# Patient Record
Sex: FEMALE | Race: WHITE | NOT HISPANIC OR LATINO | Employment: FULL TIME | URBAN - METROPOLITAN AREA
[De-identification: names, ages, dates, MRNs, and addresses within clinical notes are randomized per-mention and may not be internally consistent; named-entity substitution may affect disease eponyms.]

---

## 2017-08-23 ENCOUNTER — HOSPITAL ENCOUNTER (OUTPATIENT)
Dept: MAMMOGRAPHY | Facility: HOSPITAL | Age: 47
Discharge: HOME | End: 2017-08-23
Attending: FAMILY MEDICINE

## 2017-08-23 DIAGNOSIS — Z12.31 VISIT FOR SCREENING MAMMOGRAM: ICD-10-CM

## 2018-08-22 ENCOUNTER — RECORDS - HEALTHEAST (OUTPATIENT)
Dept: LAB | Facility: CLINIC | Age: 48
End: 2018-08-22

## 2018-08-22 LAB
CHOLEST SERPL-MCNC: 152 MG/DL
FASTING STATUS PATIENT QL REPORTED: ABNORMAL
FSH SERPL-ACNC: 37.2 MIU/ML
HDLC SERPL-MCNC: 49 MG/DL
LDLC SERPL CALC-MCNC: 80 MG/DL
TRIGL SERPL-MCNC: 116 MG/DL
TSH SERPL DL<=0.005 MIU/L-ACNC: 1.5 UIU/ML (ref 0.3–5)

## 2018-08-31 ENCOUNTER — HOSPITAL ENCOUNTER (OUTPATIENT)
Dept: MAMMOGRAPHY | Facility: CLINIC | Age: 48
Discharge: HOME | End: 2018-08-31
Attending: FAMILY MEDICINE

## 2018-08-31 DIAGNOSIS — Z12.31 VISIT FOR SCREENING MAMMOGRAM: ICD-10-CM

## 2018-09-25 ENCOUNTER — RECORDS - HEALTHEAST (OUTPATIENT)
Dept: ADMINISTRATIVE | Facility: OTHER | Age: 48
End: 2018-09-25

## 2018-09-26 ENCOUNTER — RECORDS - HEALTHEAST (OUTPATIENT)
Dept: ADMINISTRATIVE | Facility: OTHER | Age: 48
End: 2018-09-26

## 2018-09-26 LAB
LAB AP CHARGES (HE HISTORICAL CONVERSION): NORMAL
PATH REPORT.COMMENTS IMP SPEC: NORMAL
PATH REPORT.FINAL DX SPEC: NORMAL
PATH REPORT.GROSS SPEC: NORMAL
PATH REPORT.MICROSCOPIC SPEC OTHER STN: NORMAL
PATH REPORT.RELEVANT HX SPEC: NORMAL
RESULT FLAG (HE HISTORICAL CONVERSION): NORMAL

## 2019-03-05 ENCOUNTER — RECORDS - HEALTHEAST (OUTPATIENT)
Dept: LAB | Facility: CLINIC | Age: 49
End: 2019-03-05

## 2019-03-05 LAB
CHOLEST SERPL-MCNC: 152 MG/DL
FASTING STATUS PATIENT QL REPORTED: NORMAL
HDLC SERPL-MCNC: 68 MG/DL
LDLC SERPL CALC-MCNC: 68 MG/DL
TRIGL SERPL-MCNC: 79 MG/DL
TSH SERPL DL<=0.005 MIU/L-ACNC: 1.69 UIU/ML (ref 0.3–5)

## 2019-03-06 LAB
HPV SOURCE: NORMAL
HUMAN PAPILLOMA VIRUS 16 DNA: NEGATIVE
HUMAN PAPILLOMA VIRUS 18 DNA: NEGATIVE
HUMAN PAPILLOMA VIRUS FINAL DIAGNOSIS: NORMAL
HUMAN PAPILLOMA VIRUS OTHER HR: NEGATIVE
SPECIMEN DESCRIPTION: NORMAL

## 2019-03-12 LAB
BKR LAB AP ABNORMAL BLEEDING: NO
BKR LAB AP BIRTH CONTROL/HORMONES: NORMAL
BKR LAB AP CERVICAL APPEARANCE: NORMAL
BKR LAB AP GYN ADEQUACY: NORMAL
BKR LAB AP GYN INTERPRETATION: NORMAL
BKR LAB AP HPV REFLEX: NORMAL
BKR LAB AP LMP: 2014
BKR LAB AP PATIENT STATUS: NORMAL
BKR LAB AP PREVIOUS ABNORMAL: NORMAL
BKR LAB AP PREVIOUS NORMAL: 2015
HIGH RISK?: NO
PATH REPORT.COMMENTS IMP SPEC: NORMAL
RESULT FLAG (HE HISTORICAL CONVERSION): NORMAL

## 2019-10-03 ENCOUNTER — COMMUNICATION - HEALTHEAST (OUTPATIENT)
Dept: HEALTH INFORMATION MANAGEMENT | Facility: CLINIC | Age: 49
End: 2019-10-03

## 2020-01-23 ENCOUNTER — HOSPITAL ENCOUNTER (OUTPATIENT)
Dept: MAMMOGRAPHY | Facility: CLINIC | Age: 50
Discharge: HOME | End: 2020-01-23
Attending: FAMILY MEDICINE

## 2020-01-23 DIAGNOSIS — Z12.31 VISIT FOR SCREENING MAMMOGRAM: ICD-10-CM

## 2020-01-24 ENCOUNTER — RECORDS - HEALTHEAST (OUTPATIENT)
Dept: ADMINISTRATIVE | Facility: OTHER | Age: 50
End: 2020-01-24

## 2020-01-30 ENCOUNTER — HOSPITAL ENCOUNTER (OUTPATIENT)
Dept: MAMMOGRAPHY | Facility: CLINIC | Age: 50
Discharge: HOME | End: 2020-01-30
Attending: FAMILY MEDICINE

## 2020-01-30 DIAGNOSIS — N64.89 BREAST ASYMMETRY: ICD-10-CM

## 2020-06-16 ENCOUNTER — RECORDS - HEALTHEAST (OUTPATIENT)
Dept: LAB | Facility: CLINIC | Age: 50
End: 2020-06-16

## 2020-06-16 LAB — TSH SERPL DL<=0.005 MIU/L-ACNC: 1.55 UIU/ML (ref 0.3–5)

## 2021-05-29 ENCOUNTER — RECORDS - HEALTHEAST (OUTPATIENT)
Dept: ADMINISTRATIVE | Facility: CLINIC | Age: 51
End: 2021-05-29

## 2021-06-11 ENCOUNTER — RECORDS - HEALTHEAST (OUTPATIENT)
Dept: SCHEDULING | Facility: CLINIC | Age: 51
End: 2021-06-11

## 2021-06-11 DIAGNOSIS — Z12.31 SCREENING MAMMOGRAM, ENCOUNTER FOR: ICD-10-CM

## 2021-06-19 NOTE — LETTER
Letter by Mian Fournier at      Author: Mian Fournier Service: -- Author Type: --    Filed:  Encounter Date: 10/3/2019 Status: Signed         October 3, 2019       Che Garcia  [address]  [place] [address]    Dear Che Garcia:    We are pleased to provide you with secure, online access to medical information for you and your family. Per your request, we have expanded your account to allow access to the records of the following family members:              Michael Abrams (privilege never ends.)            Char Abrams (privilege never ends.)     How Do I Login?  1. In your Internet browser, go to [email or web address].  2. Click on Sign Up Now   3. Enter your [employer] Activation Code exactly as it appears below. This code will  60 days after it is generated. You will not need to use this code after you have completed the sign-up process. If you do not sign up before the expiration date, you must request a new code.     [employer] Activation Code: [email or web address]  Expires: 2019  8:28 AM    4. Enter in your date of birth and zip code.  5. Create a [employer] username. Think of one that is secure and easy to remember.  Your username must be between 6 and 20 characters.  6. Create a [employer] password. You can change your password at any time. Your password must be between 8 and 45 characters, contain at least two letters and one number, and contain both upper and lower case letters.  7. Choose a security question, enter your answer, and click Next. This can be used to access [employer] if you forget your password.   8. Enter a valid e-mail address to receive e-mail notifications when new information is available in [employer].  9. Click Sign In.        How Do I Access a Family Member's Account?  10. Select the account you want to access by clicking the [place] with the appropriate patient's name at the top of your screen.   11. You will see a disclaimer page letting you know that  you will be viewing a family member's record. Review the disclaimer and then click Accept Proxy Access Disclaimer to proceed.  12. Once you switch to viewing a family member's record, you can navigate to [employer] pages the same way you would for yourself. You can return to your own account by clicking the [place] at the top of the screen with your name on it.    13. To customize colors and names of the linked accounts, you can select Personalize from the Profile dropdown menu at the top of the screen, then click the Edit button to make changes.      Additional Information  If you have questions, you can e-mail [email or web address] or call [phone] to talk to our [facility] [employer] staff. Remember, [employer] is NOT to be used for urgent needs. For medical emergencies, dial 911.

## 2021-06-27 ENCOUNTER — HEALTH MAINTENANCE LETTER (OUTPATIENT)
Age: 51
End: 2021-06-27

## 2021-07-21 ENCOUNTER — RECORDS - HEALTHEAST (OUTPATIENT)
Dept: ADMINISTRATIVE | Facility: CLINIC | Age: 51
End: 2021-07-21

## 2021-07-22 ENCOUNTER — VIRTUAL VISIT (OUTPATIENT)
Dept: PHARMACY | Facility: PHYSICIAN GROUP | Age: 51
End: 2021-07-22
Payer: COMMERCIAL

## 2021-07-22 DIAGNOSIS — F41.1 GENERALIZED ANXIETY DISORDER: ICD-10-CM

## 2021-07-22 DIAGNOSIS — J30.2 SEASONAL ALLERGIC RHINITIS, UNSPECIFIED TRIGGER: ICD-10-CM

## 2021-07-22 DIAGNOSIS — G47.00 INSOMNIA, UNSPECIFIED TYPE: Primary | ICD-10-CM

## 2021-07-22 PROCEDURE — 99605 MTMS BY PHARM NP 15 MIN: CPT | Performed by: PHARMACIST

## 2021-07-22 NOTE — PROGRESS NOTES
Medication Therapy Management (MTM) Encounter    ASSESSMENT:                            Medication Adherence/Access: No issues identified    Insomnia: Given limited long term benefits and potential risks of using benzodiazepine for sleep she would benefit from continuing to slowly wean off lorazepam. She has successfully weaned down to low dose and will likely tolerate weaning remaining dose over next two months while working on cognitive behavioral therapy. Recommend stopping lorazepam first before considering tapering off trazodone. Reviewed benefits of stopping lorazepam and what to expect for signs and duration of possible withdrawal effects, do not anticipate this being significant with low dose.     Anxiety, Mood: Stable. She would benefit from continuing venlafaxine.      Allergies: Stable.     PLAN:                            1. Decrease lorazepam to 0.125 mg (1/4 of 0.5 mg table) at bedtime for 1 month, then plan to decrease to 0.125 mg every other day  2. Continue trazodone 100 mg and melatonin 10 mg at bedtime for now.   3. Continue venlafaxine ER 75 mg every morning    Follow-up: Return in 1 month (on 8/24/2021) for medication therapy management visit with me using a phone visit at 8:30 AM.      SUBJECTIVE/OBJECTIVE:                          Che Garcia is a 51 year old female called for an initial visit. She was referred to me from Cookie Flores MD.      Reason for visit: Interested in stopping lorazepam for sleep.    Allergies/ADRs: None  Tobacco: She reports that she has never smoked. She has never used smokeless tobacco.  Alcohol: 2-3 alcohol drinks a day, wine, working on reducing  Past Medical History: Reviewed in chart  Personal Healthcare Goals: Not need to take lorazepam for sleep    Medication Adherence/Access: no issues reported    Insomnia:   Current medications:   lorazepam 0.25 mg (1/2x 0.5 mg tablet) at bedtime  trazodone 100 mg at bedtime  melatonin 10 mg at bedtime    She has been  taking lorazepam every night for many years and is interested in getting off this because she is worried about long term risks and dependence. She has started to cut back on her dose herself, initially decreased to 3/4 of 0.5 mg tablet for awhile then to 1/2 of 0.5 mg tablet. She has been taking this dose for about 2 months now. She does think she initially noticed some difference in her sleep with the lower dose but not much significant change. She has also been taking trazodone for a long time and finds this really helpful for sleep. She is also taking 10 mg of melatonin. Recently tried quetiapine but she did not like how this effected her sleep and stopped it. She is working with a counselor doing cognitive behavioral therapy for sleep. She has found this really helpful and is hoping to work towards reducing dependence on medication for sleep. CBT has helped identify behavior and environmental changes she can make to help with sleep. She has some anxiety of being off lorazepam completely and thinks slowly coming off it would be helpful to reduce this. She would like to also get off trazodone at some point. One of the other goals is working on is reducing alcohol use.        Anxiety, Mood:   Current medications:  venlafaxine ER 75 mg once daily in the morning  lorazepam 0.25 mg at bedtime  She has been taking venlafaxine for awhile, started around 2016. Initially started medication after pregnancy for post-partum depression symptoms. Venlafaxine has worked really well for her. She is not experiencing any side effects. She does have some anxiety with coming off lorazepam for sleep but mainly thinks she is just more anxious of not having something.     Allergies:   Current medication: cetirizine (Zyrtec) 10 mg once daily as needed   She had been taking Zyrtec every day but recently ran out and hasn't noticed much difference in symptoms. She hasn't bought new supply yet and may start using just as needed.        Today's Vitals: There were no vitals taken for this visit.  ----------------    I spent 13 minutes with this patient today. Dr. Flores was provided the recommendations above  via routed note and Dr. Flores is the authorizing prescriber for this visit through the pharmacist collaborative practice agreement.. A copy of the visit note was provided to the patient's primary care provider.    The patient was sent via [employer] a summary of these recommendations.     Caridad Lyman, PharmD, BCACP  Medication Therapy Management Pharmacist  [facility]  Pager: [phone]      Telemedicine Visit Details  Type of service:  Telephone visit  Start Time: 8:35 AM  End Time: 8:48 AM  Originating Location (patient location): [place]  Distant Location (provider location):  [facility]     Medication Therapy Recommendations  Insomnia, unspecified type    Current Medication: LORazepam (ATIVAN) 0.5 MG tablet   Rationale: Patient prefers not to take - Adherence - Adherence   Recommendation: Decrease Dose - Decrease lorazepam to 0.125 mg at bedtime for 1 month, then plan to decrease to 0.125 mg every other day   Status: Accepted per Provider

## 2021-07-27 RX ORDER — PHENOL 1.4 %
10 AEROSOL, SPRAY (ML) MUCOUS MEMBRANE AT BEDTIME
COMMUNITY
Start: 2021-03-01

## 2021-07-27 RX ORDER — TRAZODONE HYDROCHLORIDE 50 MG/1
100 TABLET, FILM COATED ORAL AT BEDTIME
COMMUNITY
Start: 2006-02-22 | End: 2021-08-24

## 2021-07-27 RX ORDER — LORAZEPAM 0.5 MG/1
0.12 TABLET ORAL
COMMUNITY
Start: 2021-08-25 | End: 2021-09-08

## 2021-07-27 RX ORDER — CETIRIZINE HYDROCHLORIDE 10 MG/1
10 TABLET ORAL DAILY PRN
COMMUNITY

## 2021-07-27 RX ORDER — ESTRADIOL 0.1 MG/G
CREAM VAGINAL
COMMUNITY

## 2021-07-27 RX ORDER — VENLAFAXINE HYDROCHLORIDE 75 MG/1
75 CAPSULE, EXTENDED RELEASE ORAL DAILY
COMMUNITY
Start: 2021-01-29

## 2021-07-27 NOTE — PATIENT INSTRUCTIONS
Recommendations from today's MTM visit:                                                    MTM (medication therapy management) is a service provided by a clinical pharmacist designed to help you get the most of out of your medicines. Today we reviewed what your medicines are for, how to know if they are working, that your medicines are safe and how to make your medicine regimen as easy as possible.      1. Decrease lorazepam to 0.125 mg (1/4 of 0.5 mg table) at bedtime for 1 month, then plan to decrease to 0.125 mg every other day  2. Continue trazodone 100 mg and melatonin 10 mg at bedtime for now.   3. Continue venlafaxine ER 75 mg every morning    Follow-up: Return in 1 month (on 8/24/2021) for medication therapy management visit with me using a phone visit at 8:30 AM.    Your insurance does not cover MTM visits with me. You will receive a bill for $60 from [facility] for our visit.     It was great to speak with you today.  I value your experience and would be very thankful for your time with providing feedback on our clinic survey. You may receive a survey via email or text message in the next few days.     To schedule another MTM appointment, please call the clinic directly or you may call the MTM scheduling line at [phone] or toll-free at [phone].     My Clinical Pharmacist's contact information:                                                      Please feel free to contact me with any questions or concerns you have.      Caridad Lyman, PharmD, BCACP  Medication Therapy Management Pharmacist  [facility]  Pager: [phone]

## 2021-08-24 ENCOUNTER — VIRTUAL VISIT (OUTPATIENT)
Dept: PHARMACY | Facility: PHYSICIAN GROUP | Age: 51
End: 2021-08-24
Payer: COMMERCIAL

## 2021-08-24 DIAGNOSIS — G47.00 INSOMNIA, UNSPECIFIED TYPE: Primary | ICD-10-CM

## 2021-08-24 DIAGNOSIS — F41.1 GENERALIZED ANXIETY DISORDER: ICD-10-CM

## 2021-08-24 PROCEDURE — 99606 MTMS BY PHARM EST 15 MIN: CPT | Performed by: PHARMACIST

## 2021-08-24 RX ORDER — TRAZODONE HYDROCHLORIDE 50 MG/1
TABLET, FILM COATED ORAL
Start: 2021-08-24 | End: 2021-10-19

## 2021-08-24 NOTE — PROGRESS NOTES
Medication Therapy Management (MTM) Encounter    ASSESSMENT:                            Medication Adherence/Access: No issues identified    Insomnia: Given limited long term benefits and potential risks of using benzodiazepine for sleep she would benefit from continuing to slowly wean off lorazepam. She has successfully weaned down to low dose and will likely tolerate weaning remaining dose over next two weeks while working on cognitive behavioral therapy. Recommend one to two weeks off lorazepam before starting to decrease trazodone.     Anxiety, Mood: Stable. She would benefit from continuing venlafaxine.      PLAN:                            1. Decrease lorazepam to three times a week for the next two weeks (8/25-9/8), then stop.   2. After 1-2 weeks off lorazepam, decrease trazodone to 50 mg for 1 month. After this, you could stop trazodone or decrease to 25 mg (1/2 of 50 mg tablet) for 2 weeks then stop.   3. Okay to continue melatonin 10 mg at bedtime along with cognitive behavioral therapy.   4. Continue venlafaxine ER 75 mg every morning.     Follow-up: Return in about 2 months (around 10/24/2021) for check in using a [employer] message.      SUBJECTIVE/OBJECTIVE:                          Che Garcia is a 51 year old female called for a follow-up visit. She was referred to me from Cookie Flores MD.  Today's visit is a follow-up MTM visit from 7/22/2021.     Reason for visit: Taper off sleep medications.    Allergies/ADRs: Reviewed in chart  Past Medical History: Reviewed in chart  Tobacco: She reports that she has never smoked. She has never used smokeless tobacco.  Alcohol: 2-3 alcohol drinks a day, wine, working on reducing  Personal Healthcare Goals: Not need to take lorazepam for sleep    Medication Adherence/Access: no issues reported    Insomnia:   Current medications:   lorazepam 0.125 mg (1/4x 0.5 mg tablet) at bedtime every other day  trazodone 100 mg (2x 50 mg tablet) at bedtime  melatonin 10 mg  at bedtime  She has been taking lorazepam every night for many years and is interested in getting off this because she is worried about long term risks and dependence. After our last visit she reduced her dose to 0.125 mg (1/4 tab) at bedtime for 1 month and did well with this, about a week ago she reduced further to 0.125 mg every other day. She did have a couple nights of worsened sleep but since then her sleep has been really good. She would like to continue to taper off lorazepam today.. She has also been taking trazodone for a long time and finds this really helpful for sleep but wants to try to get off it as well. She is also taking 10 mg of melatonin. She is working with a counselor doing cognitive behavioral therapy for sleep. She has found this really helpful and is hoping to work towards reducing dependence on medication for sleep. CBT has helped identify behavior and environmental changes she can make to help with sleep.  One of the other goals is working on is reducing alcohol use.        Anxiety, Mood:   Current medications:  venlafaxine ER 75 mg once daily in the morning  lorazepam 0.125 mg (1/4x 0.5 mg tablet) at bedtime every other day  She has been taking venlafaxine for awhile, started around 2016. Initially started medication after pregnancy for post-partum depression symptoms. Venlafaxine has worked really well for her. She is not experiencing any side effects. Since her slow decrease of lorazepam has gone so well she is less anxious of coming off completely.       Today's Vitals: There were no vitals taken for this visit.  ----------------    I spent 9 minutes with this patient today. All changes were made via verbal approval and collaborative practice agreement with Cookie Flores MD. A copy of the visit note was provided to the patient's primary care provider.    The patient was sent via [employer] a summary of these recommendations.     Caridad Lyman, PharmD, BCACP  Medication Therapy  Management Pharmacist  [facility]  Pager: [phone]      Telemedicine Visit Details  Type of service:  Telephone visit  Start Time: 8:33 AM  End Time: 8:42 AM  Originating Location (patient location): Home  Distant Location (provider location):  [facility]     Medication Therapy Recommendations  Insomnia, unspecified type    Current Medication: LORazepam (ATIVAN) 0.5 MG tablet   Rationale: Unsafe medication for the patient - Adverse medication event - Safety   Recommendation: Discontinue Medication - Decrease lorazepam to three times a week for the next two weeks (8/25-9/8), then stop.   Status: Accepted per Provider          Current Medication: traZODone (DESYREL) 50 MG tablet (Discontinued)   Rationale: Nonmedication therapy more appropriate - Unnecessary medication therapy - Indication   Recommendation: Decrease Duration of Medication - Decrease trazodone to 50 mg for 1 month. After this, you could stop trazodone or decrease to 25 mg (1/2 of 50 mg tablet) for 2 weeks then stop.   Status: Accepted per CPA

## 2021-08-24 NOTE — PATIENT INSTRUCTIONS
Recommendations from today's MTM visit:                                                       1. Decrease lorazepam to three times a week for the next two weeks (8/25-9/8), then stop.   2. After 1-2 weeks off lorazepam, decrease trazodone to 50 mg for 1 month. After this, you could stop trazodone or decrease to 25 mg (1/2 of 50 mg tablet) for 2 weeks then stop.   3. Okay to continue melatonin 10 mg at bedtime along with cognitive behavioral therapy.   4. Continue venlafaxine ER 75 mg every morning.     Follow-up: Return in about 2 months (around 10/24/2021) for check in using a [employer] message.        It was great to speak with you today.  I value your experience and would be very thankful for your time with providing feedback on our clinic survey. You may receive a survey via email or text message in the next few days.     To schedule another MTM appointment, please call the clinic directly or you may call the MTM scheduling line at [phone] or toll-free at [phone].     My Clinical Pharmacist's contact information:                                                      Please feel free to contact me with any questions or concerns you have.      Caridad Lyman, PharmD, BCACP  Medication Therapy Management Pharmacist  [facility]  Pager: [phone]

## 2021-10-17 ENCOUNTER — HEALTH MAINTENANCE LETTER (OUTPATIENT)
Age: 51
End: 2021-10-17

## 2022-05-17 ENCOUNTER — LAB REQUISITION (OUTPATIENT)
Dept: LAB | Facility: CLINIC | Age: 52
End: 2022-05-17

## 2022-05-17 DIAGNOSIS — E03.9 HYPOTHYROIDISM, UNSPECIFIED: ICD-10-CM

## 2022-05-17 DIAGNOSIS — Z13.1 ENCOUNTER FOR SCREENING FOR DIABETES MELLITUS: ICD-10-CM

## 2022-05-17 LAB
GLUCOSE BLD-MCNC: 83 MG/DL (ref 70–125)
TSH SERPL DL<=0.005 MIU/L-ACNC: 1.65 UIU/ML (ref 0.3–5)

## 2022-05-17 PROCEDURE — 84443 ASSAY THYROID STIM HORMONE: CPT | Performed by: FAMILY MEDICINE

## 2022-05-17 PROCEDURE — 82947 ASSAY GLUCOSE BLOOD QUANT: CPT | Performed by: FAMILY MEDICINE

## 2022-05-19 ENCOUNTER — ANCILLARY PROCEDURE (OUTPATIENT)
Dept: MAMMOGRAPHY | Facility: CLINIC | Age: 52
End: 2022-05-19
Attending: FAMILY MEDICINE
Payer: COMMERCIAL

## 2022-05-19 DIAGNOSIS — Z12.31 SCREENING MAMMOGRAM, ENCOUNTER FOR: ICD-10-CM

## 2022-05-19 PROCEDURE — 77067 SCR MAMMO BI INCL CAD: CPT

## 2022-07-23 ENCOUNTER — HEALTH MAINTENANCE LETTER (OUTPATIENT)
Age: 52
End: 2022-07-23

## 2022-10-01 ENCOUNTER — HEALTH MAINTENANCE LETTER (OUTPATIENT)
Age: 52
End: 2022-10-01

## 2023-05-22 ENCOUNTER — ANCILLARY PROCEDURE (OUTPATIENT)
Dept: MAMMOGRAPHY | Facility: HOSPITAL | Age: 53
End: 2023-05-22
Attending: FAMILY MEDICINE
Payer: COMMERCIAL

## 2023-05-22 DIAGNOSIS — Z12.31 VISIT FOR SCREENING MAMMOGRAM: ICD-10-CM

## 2023-05-22 PROCEDURE — 77067 SCR MAMMO BI INCL CAD: CPT

## 2023-05-30 ENCOUNTER — HOSPITAL ENCOUNTER (OUTPATIENT)
Dept: ULTRASOUND IMAGING | Facility: HOSPITAL | Age: 53
Discharge: HOME | End: 2023-05-30
Attending: FAMILY MEDICINE | Admitting: FAMILY MEDICINE
Payer: COMMERCIAL

## 2023-05-30 DIAGNOSIS — R10.32 LEFT INGUINAL PAIN: ICD-10-CM

## 2023-05-30 DIAGNOSIS — Z87.19 HISTORY OF INGUINAL HERNIA: ICD-10-CM

## 2023-05-30 PROCEDURE — 76705 ECHO EXAM OF ABDOMEN: CPT

## 2023-08-12 ENCOUNTER — HEALTH MAINTENANCE LETTER (OUTPATIENT)
Age: 53
End: 2023-08-12

## 2024-06-06 ENCOUNTER — ANCILLARY PROCEDURE (OUTPATIENT)
Dept: MAMMOGRAPHY | Facility: CLINIC | Age: 54
End: 2024-06-06
Attending: FAMILY MEDICINE
Payer: COMMERCIAL

## 2024-06-06 DIAGNOSIS — Z12.31 VISIT FOR SCREENING MAMMOGRAM: ICD-10-CM

## 2024-06-06 PROCEDURE — 77063 BREAST TOMOSYNTHESIS BI: CPT

## 2024-09-29 ENCOUNTER — HEALTH MAINTENANCE LETTER (OUTPATIENT)
Age: 54
End: 2024-09-29

## 2025-02-25 ENCOUNTER — LAB REQUISITION (OUTPATIENT)
Dept: LAB | Facility: CLINIC | Age: 55
End: 2025-02-25

## 2025-02-25 DIAGNOSIS — Z13.220 ENCOUNTER FOR SCREENING FOR LIPOID DISORDERS: ICD-10-CM

## 2025-02-25 DIAGNOSIS — Z01.419 ENCOUNTER FOR GYNECOLOGICAL EXAMINATION (GENERAL) (ROUTINE) WITHOUT ABNORMAL FINDINGS: ICD-10-CM

## 2025-02-25 PROCEDURE — 87624 HPV HI-RISK TYP POOLED RSLT: CPT | Performed by: FAMILY MEDICINE

## 2025-02-25 PROCEDURE — 80048 BASIC METABOLIC PNL TOTAL CA: CPT | Performed by: FAMILY MEDICINE

## 2025-02-25 PROCEDURE — 80061 LIPID PANEL: CPT | Performed by: FAMILY MEDICINE

## 2025-02-26 LAB
ANION GAP SERPL CALCULATED.3IONS-SCNC: 12 MMOL/L (ref 7–15)
BUN SERPL-MCNC: 15.1 MG/DL (ref 6–20)
CALCIUM SERPL-MCNC: 9.4 MG/DL (ref 8.8–10.4)
CHLORIDE SERPL-SCNC: 102 MMOL/L (ref 98–107)
CHOLEST SERPL-MCNC: 167 MG/DL
CREAT SERPL-MCNC: 0.85 MG/DL (ref 0.51–0.95)
EGFRCR SERPLBLD CKD-EPI 2021: 81 ML/MIN/1.73M2
FASTING STATUS PATIENT QL REPORTED: ABNORMAL
FASTING STATUS PATIENT QL REPORTED: ABNORMAL
GLUCOSE SERPL-MCNC: 101 MG/DL (ref 70–99)
HCO3 SERPL-SCNC: 26 MMOL/L (ref 22–29)
HDLC SERPL-MCNC: 56 MG/DL
LDLC SERPL CALC-MCNC: 79 MG/DL
NONHDLC SERPL-MCNC: 111 MG/DL
POTASSIUM SERPL-SCNC: 3.8 MMOL/L (ref 3.4–5.3)
SODIUM SERPL-SCNC: 140 MMOL/L (ref 135–145)
TRIGL SERPL-MCNC: 159 MG/DL

## 2025-02-27 LAB
HPV HR 12 DNA CVX QL NAA+PROBE: NEGATIVE
HPV16 DNA CVX QL NAA+PROBE: NEGATIVE
HPV18 DNA CVX QL NAA+PROBE: NEGATIVE
HUMAN PAPILLOMA VIRUS FINAL DIAGNOSIS: NORMAL

## 2025-03-03 LAB
BKR AP ASSOCIATED HPV REPORT: NORMAL
BKR LAB AP GYN ADEQUACY: NORMAL
BKR LAB AP GYN INTERPRETATION: NORMAL
BKR LAB AP LMP: NORMAL
BKR LAB AP PREVIOUS ABNL DX: NORMAL
BKR LAB AP PREVIOUS ABNORMAL: NORMAL
PATH REPORT.COMMENTS IMP SPEC: NORMAL
PATH REPORT.COMMENTS IMP SPEC: NORMAL
PATH REPORT.RELEVANT HX SPEC: NORMAL